# Patient Record
Sex: MALE | Race: WHITE | HISPANIC OR LATINO | Employment: OTHER | URBAN - METROPOLITAN AREA
[De-identification: names, ages, dates, MRNs, and addresses within clinical notes are randomized per-mention and may not be internally consistent; named-entity substitution may affect disease eponyms.]

---

## 2018-07-26 ENCOUNTER — HOSPITAL ENCOUNTER (EMERGENCY)
Facility: HOSPITAL | Age: 73
Discharge: LEFT BEFORE DISCHARGE | End: 2018-07-26
Attending: EMERGENCY MEDICINE | Admitting: EMERGENCY MEDICINE
Payer: MEDICARE

## 2018-07-26 ENCOUNTER — APPOINTMENT (EMERGENCY)
Dept: RADIOLOGY | Facility: HOSPITAL | Age: 73
End: 2018-07-26
Payer: MEDICARE

## 2018-07-26 VITALS
RESPIRATION RATE: 14 BRPM | OXYGEN SATURATION: 99 % | DIASTOLIC BLOOD PRESSURE: 96 MMHG | HEART RATE: 88 BPM | SYSTOLIC BLOOD PRESSURE: 157 MMHG | TEMPERATURE: 97 F | WEIGHT: 189.31 LBS

## 2018-07-26 DIAGNOSIS — R07.9 CHEST PAIN: Primary | ICD-10-CM

## 2018-07-26 LAB
ALBUMIN SERPL BCP-MCNC: 4.4 G/DL (ref 3–5.2)
ALP SERPL-CCNC: 74 U/L (ref 43–122)
ALT SERPL W P-5'-P-CCNC: 31 U/L (ref 9–52)
ANION GAP SERPL CALCULATED.3IONS-SCNC: 12 MMOL/L (ref 5–14)
APTT PPP: 29 SECONDS (ref 23–34)
AST SERPL W P-5'-P-CCNC: 24 U/L (ref 17–59)
BASOPHILS # BLD AUTO: 0.1 THOUSANDS/ΜL (ref 0–0.1)
BASOPHILS NFR BLD AUTO: 1 % (ref 0–1)
BILIRUB SERPL-MCNC: 0.6 MG/DL
BUN SERPL-MCNC: 17 MG/DL (ref 5–25)
CALCIUM SERPL-MCNC: 9.5 MG/DL (ref 8.4–10.2)
CHLORIDE SERPL-SCNC: 106 MMOL/L (ref 97–108)
CK SERPL-CCNC: 120 U/L (ref 55–170)
CO2 SERPL-SCNC: 23 MMOL/L (ref 22–30)
CREAT SERPL-MCNC: 1.19 MG/DL (ref 0.7–1.5)
EOSINOPHIL # BLD AUTO: 0.1 THOUSAND/ΜL (ref 0–0.4)
EOSINOPHIL NFR BLD AUTO: 1 % (ref 0–6)
ERYTHROCYTE [DISTWIDTH] IN BLOOD BY AUTOMATED COUNT: 13.9 %
GFR SERPL CREATININE-BSD FRML MDRD: 61 ML/MIN/1.73SQ M
GLUCOSE SERPL-MCNC: 140 MG/DL (ref 70–99)
HCT VFR BLD AUTO: 46.8 % (ref 41–53)
HGB BLD-MCNC: 15.7 G/DL (ref 13.5–17.5)
INR PPP: 1.02 (ref 0.89–1.1)
LYMPHOCYTES # BLD AUTO: 1.3 THOUSANDS/ΜL (ref 0.5–4)
LYMPHOCYTES NFR BLD AUTO: 14 % (ref 20–50)
MAGNESIUM SERPL-MCNC: 2.2 MG/DL (ref 1.6–2.3)
MCH RBC QN AUTO: 30.1 PG (ref 26–34)
MCHC RBC AUTO-ENTMCNC: 33.6 G/DL (ref 31–36)
MCV RBC AUTO: 90 FL (ref 80–100)
MONOCYTES # BLD AUTO: 0.5 THOUSAND/ΜL (ref 0.2–0.9)
MONOCYTES NFR BLD AUTO: 5 % (ref 1–10)
NEUTROPHILS # BLD AUTO: 7.3 THOUSANDS/ΜL (ref 1.8–7.8)
NEUTS SEG NFR BLD AUTO: 79 % (ref 45–65)
NT-PROBNP SERPL-MCNC: 459 PG/ML (ref 0–299)
PLATELET # BLD AUTO: 217 THOUSANDS/UL (ref 150–450)
PMV BLD AUTO: 8.7 FL (ref 8.9–12.7)
POTASSIUM SERPL-SCNC: 4.1 MMOL/L (ref 3.6–5)
PROT SERPL-MCNC: 8.2 G/DL (ref 5.9–8.4)
PROTHROMBIN TIME: 10.8 SECONDS (ref 9.5–11.6)
RBC # BLD AUTO: 5.22 MILLION/UL (ref 4.5–5.9)
SODIUM SERPL-SCNC: 141 MMOL/L (ref 137–147)
TROPONIN I SERPL-MCNC: <0.01 NG/ML (ref 0–0.03)
TSH SERPL DL<=0.05 MIU/L-ACNC: 1.26 UIU/ML (ref 0.47–4.68)
WBC # BLD AUTO: 9.2 THOUSAND/UL (ref 4.5–11)

## 2018-07-26 PROCEDURE — 83880 ASSAY OF NATRIURETIC PEPTIDE: CPT | Performed by: EMERGENCY MEDICINE

## 2018-07-26 PROCEDURE — 84484 ASSAY OF TROPONIN QUANT: CPT | Performed by: EMERGENCY MEDICINE

## 2018-07-26 PROCEDURE — 36415 COLL VENOUS BLD VENIPUNCTURE: CPT | Performed by: EMERGENCY MEDICINE

## 2018-07-26 PROCEDURE — 82550 ASSAY OF CK (CPK): CPT | Performed by: EMERGENCY MEDICINE

## 2018-07-26 PROCEDURE — 80053 COMPREHEN METABOLIC PANEL: CPT | Performed by: EMERGENCY MEDICINE

## 2018-07-26 PROCEDURE — 84443 ASSAY THYROID STIM HORMONE: CPT | Performed by: EMERGENCY MEDICINE

## 2018-07-26 PROCEDURE — 83735 ASSAY OF MAGNESIUM: CPT | Performed by: EMERGENCY MEDICINE

## 2018-07-26 PROCEDURE — 71045 X-RAY EXAM CHEST 1 VIEW: CPT

## 2018-07-26 PROCEDURE — 93005 ELECTROCARDIOGRAM TRACING: CPT

## 2018-07-26 PROCEDURE — 85610 PROTHROMBIN TIME: CPT | Performed by: EMERGENCY MEDICINE

## 2018-07-26 PROCEDURE — 99285 EMERGENCY DEPT VISIT HI MDM: CPT

## 2018-07-26 PROCEDURE — 85025 COMPLETE CBC W/AUTO DIFF WBC: CPT | Performed by: EMERGENCY MEDICINE

## 2018-07-26 PROCEDURE — 85730 THROMBOPLASTIN TIME PARTIAL: CPT | Performed by: EMERGENCY MEDICINE

## 2018-07-26 RX ORDER — ASPIRIN 81 MG/1
TABLET, CHEWABLE ORAL
Status: DISCONTINUED
Start: 2018-07-26 | End: 2018-07-27 | Stop reason: HOSPADM

## 2018-07-26 RX ORDER — ASPIRIN 81 MG/1
162 TABLET, CHEWABLE ORAL ONCE
Status: COMPLETED | OUTPATIENT
Start: 2018-07-26 | End: 2018-07-26

## 2018-07-26 RX ADMIN — ASPIRIN 81 MG 162 MG: 81 TABLET ORAL at 22:39

## 2018-07-27 LAB
ATRIAL RATE: 84 BPM
P AXIS: 37 DEGREES
PR INTERVAL: 152 MS
QRS AXIS: -4 DEGREES
QRSD INTERVAL: 80 MS
QT INTERVAL: 366 MS
QTC INTERVAL: 432 MS
T WAVE AXIS: -14 DEGREES
VENTRICULAR RATE: 84 BPM

## 2018-07-27 PROCEDURE — 93010 ELECTROCARDIOGRAM REPORT: CPT | Performed by: INTERNAL MEDICINE

## 2018-07-27 NOTE — ED NOTES
Patient came out of the room with all of his clothing and stating that he wants to leave  We made him aware that if he is leaving he is leaving against medical advice  Dr Luli Guthrie was in to see patient and talk to him making him aware that he should be staying, patient understood the same       Britney Esparza RN  07/26/18 9685

## 2018-07-27 NOTE — ED PROVIDER NOTES
History  Chief Complaint   Patient presents with    Chest Pain     Midsternal chest pain that started an hour ago describes as tightness  "My left arm is shaking"  Denies shortness of breath, fevers, nausea  Didn't take anything for the pain  Patient reported less than 1 hr prior to coming to ER while sitting watching TV developed mild chest pain described distal located to the anterior chest wall socially with left hand numbness  Onset gradual persisted which prompted the ER evaluation  Patient attempted no self management  None       History reviewed  No pertinent past medical history  Past Surgical History:   Procedure Laterality Date    HERNIA REPAIR         History reviewed  No pertinent family history  I have reviewed and agree with the history as documented  Social History   Substance Use Topics    Smoking status: Never Smoker    Smokeless tobacco: Never Used    Alcohol use No        Review of Systems   Constitutional: Negative for diaphoresis  Respiratory: Negative  Gastrointestinal: Negative for nausea  All other systems reviewed and are negative  Physical Exam  Physical Exam   Constitutional: He is oriented to person, place, and time  He appears well-developed and well-nourished  HENT:   Head: Normocephalic  Eyes: Pupils are equal, round, and reactive to light  Neck: Normal range of motion  Neck supple  Cardiovascular: Normal rate  Pulmonary/Chest: Effort normal and breath sounds normal    Abdominal: Soft  Bowel sounds are normal    Musculoskeletal: Normal range of motion  Neurological: He is alert and oriented to person, place, and time  Skin: Skin is warm  Psychiatric: He has a normal mood and affect  His behavior is normal    Nursing note and vitals reviewed        Vital Signs  ED Triage Vitals [07/26/18 2154]   Temperature Pulse Respirations Blood Pressure SpO2   (!) 97 °F (36 1 °C) 88 14 157/96 99 %      Temp Source Heart Rate Source Patient Position - Orthostatic VS BP Location FiO2 (%)   Temporal -- -- -- --      Pain Score       3           Vitals:    07/26/18 2154   BP: 157/96   Pulse: 88       Visual Acuity      ED Medications  Medications   aspirin chewable tablet 162 mg (162 mg Oral Given 7/26/18 2239)       Diagnostic Studies  Results Reviewed     Procedure Component Value Units Date/Time    NT-BNP PRO [[id number]]  (Abnormal) Collected:  07/26/18 2236    Lab Status:  Final result Specimen:  Blood from Arm, Left Updated:  07/26/18 2313     NT-proBNP 459 (H) pg/mL     Troponin I [[id number]]  (Normal) Collected:  07/26/18 2236    Lab Status:  Final result Specimen:  Blood from Arm, Left Updated:  07/26/18 2313     Troponin I <0 01 ng/mL     Magnesium [[id number]]  (Normal) Collected:  07/26/18 2236    Lab Status:  Final result Specimen:  Blood from Arm, Left Updated:  07/26/18 2303     Magnesium 2 2 mg/dL     CK [[id number]]  (Normal) Collected:  07/26/18 2236    Lab Status:  Final result Specimen:  Blood from Arm, Left Updated:  07/26/18 2303     Total  U/L     Comprehensive metabolic panel [[id number]]  (Abnormal) Collected:  07/26/18 2236    Lab Status:  Final result Specimen:  Blood from Arm, Left Updated:  07/26/18 2303     Sodium 141 mmol/L      Potassium 4 1 mmol/L      Chloride 106 mmol/L      CO2 23 mmol/L      Anion Gap 12 mmol/L      BUN 17 mg/dL      Creatinine 1 19 mg/dL      Glucose 140 (H) mg/dL      Calcium 9 5 mg/dL      AST 24 U/L      ALT 31 U/L      Alkaline Phosphatase 74 U/L      Total Protein 8 2 g/dL      Albumin 4 4 g/dL      Total Bilirubin 0 60 mg/dL      eGFR 61 ml/min/1 73sq m     Narrative:         National Kid[employer] Disease Education Program recommendations are as follows:  GFR calculation is accurate only with a steady state creatinine  Chronic Kidney disease less than 60 ml/min/1 73 sq  meters  Kidney failure less than 15 ml/min/1 73 sq  meters      Protime-INR [[id number]]  (Normal) Collected:  07/26/18 2236    Lab Status: Final result Specimen:  Blood from Arm, Left Updated:  07/26/18 2302     Protime 10 8 seconds      INR 1 02    Narrative:       INR:  ,PROTIME:      APTT [[id number]]  (Normal) Collected:  07/26/18 2236    Lab Status:  Final result Specimen:  Blood from Arm, Left Updated:  07/26/18 2302     PTT 29 seconds     Narrative:       PTT:      TSH, 3rd generation [[id number]] Collected:  07/26/18 2236    Lab Status: In process Specimen:  Blood from Arm, Left Updated:  07/26/18 2251    CBC and differential [[id number]]  (Abnormal) Collected:  07/26/18 2236    Lab Status:  Final result Specimen:  Blood from Arm, Left Updated:  07/26/18 2251     WBC 9 20 Thousand/uL      RBC 5 22 Million/uL      Hemoglobin 15 7 g/dL      Hematocrit 46 8 %      MCV 90 fL      MCH 30 1 pg      MCHC 33 6 g/dL      RDW 13 9 %      MPV 8 7 (L) fL      Platelets 870 Thousands/uL      Neutrophils Relative 79 (H) %      Lymphocytes Relative 14 (L) %      Monocytes Relative 5 %      Eosinophils Relative 1 %      Basophils Relative 1 %      Neutrophils Absolute 7 30 Thousands/µL      Lymphocytes Absolute 1 30 Thousands/µL      Monocytes Absolute 0 50 Thousand/µL      Eosinophils Absolute 0 10 Thousand/µL      Basophils Absolute 0 10 Thousands/µL     UA w Reflex to Microscopic w Reflex to Culture [[id number]]     Lab Status:  No result Specimen:  Urine                  XR chest portable    (Results Pending)              Procedures  Procedures       Phone Contacts  ED Phone Contact    ED Cours patient advised that he would like to leave against medical advice      patient advised that he is going to get some medical advice he would return if his symptoms worsen    At time of discharge patient was pain-free                          MDM    CritCare Time    Disposition  Final diagnoses:   Chest pain     Time reflects when diagnosis was documented in both MDM as applicable and the Disposition within this note     Time User Action Codes Description Comment 7/26/2018 11:16 PM Justo Waters Add [R07 9] Chest pain       ED Disposition     ED Disposition Condition Comment    AMA  Date: 7/26/2018  Patient: Pedro Moe  Admitted: 7/26/2018  9:56 PM  Attending Provider: Jovi Ochoa MD    Pedro Moe or his authorized caregiver has made the decision for the patient to leave the emergency department against the advice  He  or his authorized caregiver has been informed and understands the inherent risks, including death  He or his authorized caregiver has decided to accept the responsibility for this decision  Pedro Moe and all necessary parties have been advised gail t he may return for further evaluation or treatment  His condition at time of discharge was stable  Pedro Moe had current vital signs as follows:  /96   Pulse 88   Temp (!) 97 °F (36 1 °C) (Temporal)   Resp 14   Wt 85 9 kg (189 lb 5 oz)          Follow-up Information    None         Patient's Medications    No medications on file     No discharge procedures on file      ED Provider  Electronically Signed by           Jovi Ochoa MD  07/26/18 5566

## 2023-04-17 ENCOUNTER — APPOINTMENT (OUTPATIENT)
Dept: LAB | Facility: HOSPITAL | Age: 78
End: 2023-04-17

## 2023-04-17 DIAGNOSIS — Z76.89 ENCOUNTER TO ESTABLISH CARE WITH NEW DOCTOR: ICD-10-CM

## 2023-04-17 LAB
25(OH)D3 SERPL-MCNC: 26.6 NG/ML (ref 30–100)
ALBUMIN SERPL BCP-MCNC: 4.4 G/DL (ref 3.5–5)
ALP SERPL-CCNC: 52 U/L (ref 34–104)
ALT SERPL W P-5'-P-CCNC: 21 U/L (ref 7–52)
ANION GAP SERPL CALCULATED.3IONS-SCNC: 9 MMOL/L (ref 4–13)
AST SERPL W P-5'-P-CCNC: 21 U/L (ref 13–39)
BASOPHILS # BLD AUTO: 0.06 THOUSANDS/ΜL (ref 0–0.1)
BASOPHILS NFR BLD AUTO: 1 % (ref 0–1)
BILIRUB SERPL-MCNC: 0.44 MG/DL (ref 0.2–1)
BUN SERPL-MCNC: 27 MG/DL (ref 5–25)
CALCIUM SERPL-MCNC: 9.5 MG/DL (ref 8.4–10.2)
CHLORIDE SERPL-SCNC: 108 MMOL/L (ref 96–108)
CHOLEST SERPL-MCNC: 171 MG/DL
CO2 SERPL-SCNC: 24 MMOL/L (ref 21–32)
CREAT SERPL-MCNC: 1.31 MG/DL (ref 0.6–1.3)
EOSINOPHIL # BLD AUTO: 0.25 THOUSAND/ΜL (ref 0–0.61)
EOSINOPHIL NFR BLD AUTO: 4 % (ref 0–6)
ERYTHROCYTE [DISTWIDTH] IN BLOOD BY AUTOMATED COUNT: 12.6 % (ref 11.6–15.1)
GFR SERPL CREATININE-BSD FRML MDRD: 52 ML/MIN/1.73SQ M
GLUCOSE P FAST SERPL-MCNC: 97 MG/DL (ref 65–99)
HCT VFR BLD AUTO: 43.3 % (ref 36.5–49.3)
HDLC SERPL-MCNC: 48 MG/DL
HGB BLD-MCNC: 13.8 G/DL (ref 12–17)
IMM GRANULOCYTES # BLD AUTO: 0.03 THOUSAND/UL (ref 0–0.2)
IMM GRANULOCYTES NFR BLD AUTO: 0 % (ref 0–2)
LDLC SERPL CALC-MCNC: 82 MG/DL (ref 0–100)
LYMPHOCYTES # BLD AUTO: 2.02 THOUSANDS/ΜL (ref 0.6–4.47)
LYMPHOCYTES NFR BLD AUTO: 29 % (ref 14–44)
MCH RBC QN AUTO: 29.2 PG (ref 26.8–34.3)
MCHC RBC AUTO-ENTMCNC: 31.9 G/DL (ref 31.4–37.4)
MCV RBC AUTO: 92 FL (ref 82–98)
MONOCYTES # BLD AUTO: 0.51 THOUSAND/ΜL (ref 0.17–1.22)
MONOCYTES NFR BLD AUTO: 7 % (ref 4–12)
NEUTROPHILS # BLD AUTO: 4.13 THOUSANDS/ΜL (ref 1.85–7.62)
NEUTS SEG NFR BLD AUTO: 59 % (ref 43–75)
NONHDLC SERPL-MCNC: 123 MG/DL
NRBC BLD AUTO-RTO: 0 /100 WBCS
PLATELET # BLD AUTO: 171 THOUSANDS/UL (ref 149–390)
PMV BLD AUTO: 11 FL (ref 8.9–12.7)
POTASSIUM SERPL-SCNC: 4.2 MMOL/L (ref 3.5–5.3)
PROT SERPL-MCNC: 7.1 G/DL (ref 6.4–8.4)
RBC # BLD AUTO: 4.72 MILLION/UL (ref 3.88–5.62)
SODIUM SERPL-SCNC: 141 MMOL/L (ref 135–147)
TRIGL SERPL-MCNC: 206 MG/DL
TSH SERPL DL<=0.05 MIU/L-ACNC: 2.55 UIU/ML (ref 0.45–4.5)
URATE SERPL-MCNC: 6.2 MG/DL (ref 3.5–8.5)
VIT B12 SERPL-MCNC: 315 PG/ML (ref 100–900)
WBC # BLD AUTO: 7 THOUSAND/UL (ref 4.31–10.16)

## 2023-04-18 LAB
EST. AVERAGE GLUCOSE BLD GHB EST-MCNC: 114 MG/DL
HBA1C MFR BLD: 5.6 %
LPA SERPL-SCNC: 30.3 NMOL/L
PSA FREE MFR SERPL: 15 %
PSA FREE SERPL-MCNC: 0.12 NG/ML
PSA SERPL-MCNC: 0.8 NG/ML (ref 0–4)

## 2023-04-19 LAB — BACTERIA UR CULT: NORMAL

## 2023-06-07 ENCOUNTER — APPOINTMENT (OUTPATIENT)
Dept: LAB | Facility: HOSPITAL | Age: 78
End: 2023-06-07
Payer: COMMERCIAL

## 2023-06-07 DIAGNOSIS — E78.5 DYSLIPIDEMIA: ICD-10-CM

## 2023-06-07 DIAGNOSIS — I10 HYPERTENSION, UNSPECIFIED TYPE: ICD-10-CM

## 2023-06-07 DIAGNOSIS — N18.31 CHRONIC KIDNEY DISEASE (CKD) STAGE G3A/A1, MODERATELY DECREASED GLOMERULAR FILTRATION RATE (GFR) BETWEEN 45-59 ML/MIN/1.73 SQUARE METER AND ALBUMINURIA CREATININE RATIO LESS THAN 30 MG/G (HCC): ICD-10-CM

## 2023-06-07 LAB
ALBUMIN SERPL BCP-MCNC: 4.2 G/DL (ref 3.5–5)
ALP SERPL-CCNC: 48 U/L (ref 34–104)
ALT SERPL W P-5'-P-CCNC: 14 U/L (ref 7–52)
ANION GAP SERPL CALCULATED.3IONS-SCNC: 7 MMOL/L (ref 4–13)
AST SERPL W P-5'-P-CCNC: 17 U/L (ref 13–39)
BASOPHILS # BLD AUTO: 0.07 THOUSANDS/ÂΜL (ref 0–0.1)
BASOPHILS NFR BLD AUTO: 1 % (ref 0–1)
BILIRUB SERPL-MCNC: 0.44 MG/DL (ref 0.2–1)
BUN SERPL-MCNC: 19 MG/DL (ref 5–25)
CALCIUM SERPL-MCNC: 9.1 MG/DL (ref 8.4–10.2)
CHLORIDE SERPL-SCNC: 110 MMOL/L (ref 96–108)
CO2 SERPL-SCNC: 25 MMOL/L (ref 21–32)
CREAT SERPL-MCNC: 1.39 MG/DL (ref 0.6–1.3)
EOSINOPHIL # BLD AUTO: 0.22 THOUSAND/ÂΜL (ref 0–0.61)
EOSINOPHIL NFR BLD AUTO: 3 % (ref 0–6)
ERYTHROCYTE [DISTWIDTH] IN BLOOD BY AUTOMATED COUNT: 12.8 % (ref 11.6–15.1)
GFR SERPL CREATININE-BSD FRML MDRD: 48 ML/MIN/1.73SQ M
GLUCOSE P FAST SERPL-MCNC: 97 MG/DL (ref 65–99)
HCT VFR BLD AUTO: 45.7 % (ref 36.5–49.3)
HGB BLD-MCNC: 14.3 G/DL (ref 12–17)
IMM GRANULOCYTES # BLD AUTO: 0.01 THOUSAND/UL (ref 0–0.2)
IMM GRANULOCYTES NFR BLD AUTO: 0 % (ref 0–2)
LYMPHOCYTES # BLD AUTO: 1.71 THOUSANDS/ÂΜL (ref 0.6–4.47)
LYMPHOCYTES NFR BLD AUTO: 25 % (ref 14–44)
MCH RBC QN AUTO: 28.9 PG (ref 26.8–34.3)
MCHC RBC AUTO-ENTMCNC: 31.3 G/DL (ref 31.4–37.4)
MCV RBC AUTO: 93 FL (ref 82–98)
MONOCYTES # BLD AUTO: 0.41 THOUSAND/ÂΜL (ref 0.17–1.22)
MONOCYTES NFR BLD AUTO: 6 % (ref 4–12)
NEUTROPHILS # BLD AUTO: 4.55 THOUSANDS/ÂΜL (ref 1.85–7.62)
NEUTS SEG NFR BLD AUTO: 65 % (ref 43–75)
NRBC BLD AUTO-RTO: 0 /100 WBCS
PLATELET # BLD AUTO: 175 THOUSANDS/UL (ref 149–390)
PMV BLD AUTO: 10.8 FL (ref 8.9–12.7)
POTASSIUM SERPL-SCNC: 4.3 MMOL/L (ref 3.5–5.3)
PROT SERPL-MCNC: 7.2 G/DL (ref 6.4–8.4)
RBC # BLD AUTO: 4.94 MILLION/UL (ref 3.88–5.62)
SODIUM SERPL-SCNC: 142 MMOL/L (ref 135–147)
URATE SERPL-MCNC: 4.4 MG/DL (ref 3.5–8.5)
WBC # BLD AUTO: 6.97 THOUSAND/UL (ref 4.31–10.16)

## 2023-06-07 PROCEDURE — 84550 ASSAY OF BLOOD/URIC ACID: CPT

## 2023-06-07 PROCEDURE — 85025 COMPLETE CBC W/AUTO DIFF WBC: CPT

## 2023-06-07 PROCEDURE — 80053 COMPREHEN METABOLIC PANEL: CPT

## 2023-06-07 PROCEDURE — 36415 COLL VENOUS BLD VENIPUNCTURE: CPT

## 2023-06-16 ENCOUNTER — APPOINTMENT (OUTPATIENT)
Dept: CT IMAGING | Facility: HOSPITAL | Age: 78
End: 2023-06-16
Attending: INTERNAL MEDICINE
Payer: COMMERCIAL

## 2023-06-16 ENCOUNTER — HOSPITAL ENCOUNTER (OUTPATIENT)
Dept: MRI IMAGING | Facility: HOSPITAL | Age: 78
End: 2023-06-16
Attending: INTERNAL MEDICINE
Payer: COMMERCIAL

## 2023-06-16 ENCOUNTER — HOSPITAL ENCOUNTER (OUTPATIENT)
Dept: ULTRASOUND IMAGING | Facility: HOSPITAL | Age: 78
End: 2023-06-16
Attending: INTERNAL MEDICINE
Payer: COMMERCIAL

## 2023-06-16 DIAGNOSIS — R41.3 MEMORY LOSS: ICD-10-CM

## 2023-06-16 DIAGNOSIS — N18.31 CHRONIC KIDNEY DISEASE (CKD) STAGE G3A/A1, MODERATELY DECREASED GLOMERULAR FILTRATION RATE (GFR) BETWEEN 45-59 ML/MIN/1.73 SQUARE METER AND ALBUMINURIA CREATININE RATIO LESS THAN 30 MG/G (HCC): ICD-10-CM

## 2023-06-16 PROCEDURE — G1004 CDSM NDSC: HCPCS

## 2023-06-16 PROCEDURE — 76775 US EXAM ABDO BACK WALL LIM: CPT

## 2023-06-16 PROCEDURE — 70551 MRI BRAIN STEM W/O DYE: CPT

## 2023-06-16 PROCEDURE — 70544 MR ANGIOGRAPHY HEAD W/O DYE: CPT

## 2023-06-30 ENCOUNTER — HOSPITAL ENCOUNTER (OUTPATIENT)
Dept: NON INVASIVE DIAGNOSTICS | Facility: HOSPITAL | Age: 78
Discharge: HOME | End: 2023-06-30
Attending: INTERNAL MEDICINE
Payer: COMMERCIAL

## 2023-06-30 VITALS
WEIGHT: 189 LBS | SYSTOLIC BLOOD PRESSURE: 157 MMHG | HEART RATE: 88 BPM | HEIGHT: 67 IN | BODY MASS INDEX: 29.66 KG/M2 | DIASTOLIC BLOOD PRESSURE: 96 MMHG

## 2023-06-30 DIAGNOSIS — R09.89 BRUIT: ICD-10-CM

## 2023-06-30 DIAGNOSIS — I10 HYPERTENSION, UNSPECIFIED TYPE: ICD-10-CM

## 2023-06-30 LAB
AORTIC ROOT: 4.3 CM
AORTIC VALVE MEAN VELOCITY: 6.8 M/S
APICAL FOUR CHAMBER EJECTION FRACTION: 47 %
ASCENDING AORTA: 4 CM
AV LVOT MEAN GRADIENT: 2 MMHG
AV LVOT PEAK GRADIENT: 3 MMHG
AV MEAN GRADIENT: 2 MMHG
AV PEAK GRADIENT: 5 MMHG
AV VELOCITY RATIO: 0.8
DOP CALC AO PEAK VEL: 1.07 M/S
DOP CALC AO VTI: 28.75 CM
DOP CALC LVOT PEAK VEL VTI: 22.62 CM
DOP CALC LVOT PEAK VEL: 0.86 M/S
FRACTIONAL SHORTENING: 29 % (ref 28–44)
INTERVENTRICULAR SEPTUM IN DIASTOLE (PARASTERNAL SHORT AXIS VIEW): 0.8 CM
INTERVENTRICULAR SEPTUM: 0.8 CM (ref 0.6–1.1)
LAAS-AP2: 18.9 CM2
LAAS-AP4: 14.5 CM2
LEFT ATRIUM SIZE: 4.3 CM
LEFT ATRIUM VOLUME (MOD BIPLANE): 48 ML
LEFT INTERNAL DIMENSION IN SYSTOLE: 4 CM (ref 2.1–4)
LEFT VENTRICULAR INTERNAL DIMENSION IN DIASTOLE: 5.6 CM (ref 3.5–6)
LEFT VENTRICULAR POSTERIOR WALL IN END DIASTOLE: 0.8 CM
LEFT VENTRICULAR STROKE VOLUME: 83 ML
LVSV (TEICH): 83 ML
MV E'TISSUE VEL-SEP: 5 CM/S
RIGHT VENTRICLE ID DIMENSION: 3.2 CM
SINOTUBULAR JUNCTION: 3.2 CM
SL CV LEFT ATRIUM LENGTH A2C: 4.6 CM
SL CV LV EF: 40
SL CV PED ECHO LEFT VENTRICLE DIASTOLIC VOLUME (MOD BIPLANE) 2D: 152 ML
SL CV PED ECHO LEFT VENTRICLE SYSTOLIC VOLUME (MOD BIPLANE) 2D: 69 ML
SL CV SINUS OF VALSALVA 2D: 4.3 CM
STJ: 3.2 CM
TR MAX PG: 25 MMHG
TR PEAK VELOCITY: 2.5 M/S
TRICUSPID ANNULAR PLANE SYSTOLIC EXCURSION: 2.1 CM
TRICUSPID VALVE PEAK REGURGITATION VELOCITY: 2.5 M/S

## 2023-06-30 PROCEDURE — 93306 TTE W/DOPPLER COMPLETE: CPT

## 2023-06-30 PROCEDURE — 93880 EXTRACRANIAL BILAT STUDY: CPT

## 2023-07-06 ENCOUNTER — TELEPHONE (OUTPATIENT)
Dept: CARDIOLOGY CLINIC | Facility: CLINIC | Age: 78
End: 2023-07-06

## 2023-07-06 NOTE — TELEPHONE ENCOUNTER
Called patient 3x first time got voicemail with mailbox full tried two other times rings busy like the phone Is not in service  needs his apt moved up to 12PM on 7-14 sent a letter

## 2023-07-14 ENCOUNTER — OFFICE VISIT (OUTPATIENT)
Dept: CARDIOLOGY CLINIC | Facility: CLINIC | Age: 78
End: 2023-07-14
Payer: COMMERCIAL

## 2023-07-14 VITALS
SYSTOLIC BLOOD PRESSURE: 108 MMHG | HEIGHT: 67 IN | BODY MASS INDEX: 27.31 KG/M2 | HEART RATE: 73 BPM | DIASTOLIC BLOOD PRESSURE: 58 MMHG | WEIGHT: 174 LBS

## 2023-07-14 DIAGNOSIS — I25.5 ISCHEMIC CARDIOMYOPATHY: ICD-10-CM

## 2023-07-14 DIAGNOSIS — Z86.73 HISTORY OF STROKE: ICD-10-CM

## 2023-07-14 DIAGNOSIS — I48.0 PAROXYSMAL ATRIAL FIBRILLATION (HCC): ICD-10-CM

## 2023-07-14 DIAGNOSIS — I25.2 HISTORY OF MI (MYOCARDIAL INFARCTION): ICD-10-CM

## 2023-07-14 DIAGNOSIS — I25.10 ATHEROSCLEROSIS OF NATIVE CORONARY ARTERY OF NATIVE HEART WITHOUT ANGINA PECTORIS: ICD-10-CM

## 2023-07-14 DIAGNOSIS — I49.9 CARDIAC ARRHYTHMIA, UNSPECIFIED CARDIAC ARRHYTHMIA TYPE: Primary | ICD-10-CM

## 2023-07-14 PROCEDURE — 93000 ELECTROCARDIOGRAM COMPLETE: CPT | Performed by: INTERNAL MEDICINE

## 2023-07-14 PROCEDURE — 99204 OFFICE O/P NEW MOD 45 MIN: CPT | Performed by: INTERNAL MEDICINE

## 2023-07-14 RX ORDER — SIMVASTATIN 40 MG
TABLET ORAL EVERY 24 HOURS
COMMUNITY

## 2023-07-14 RX ORDER — BROMOCRIPTINE MESYLATE 2.5 MG/1
TABLET ORAL EVERY 24 HOURS
COMMUNITY
Start: 2023-04-28

## 2023-07-14 RX ORDER — ICOSAPENT ETHYL 1000 MG/1
CAPSULE ORAL EVERY 12 HOURS
COMMUNITY
Start: 2023-04-28 | End: 2023-08-14

## 2023-07-14 RX ORDER — LISINOPRIL 20 MG/1
TABLET ORAL EVERY 24 HOURS
COMMUNITY

## 2023-07-14 RX ORDER — MEMANTINE HYDROCHLORIDE 10 MG/1
TABLET ORAL
COMMUNITY
Start: 2023-04-28

## 2023-07-14 RX ORDER — ALLOPURINOL 100 MG/1
TABLET ORAL
COMMUNITY
Start: 2023-05-03

## 2023-07-14 RX ORDER — ASPIRIN 81 MG/1
TABLET ORAL EVERY 24 HOURS
COMMUNITY
Start: 2023-04-28 | End: 2023-08-14

## 2023-07-14 RX ORDER — CLOPIDOGREL BISULFATE 75 MG/1
TABLET ORAL EVERY 24 HOURS
COMMUNITY

## 2023-07-14 NOTE — ASSESSMENT & PLAN NOTE
Mr. José Malhotra is a 61-year-old gentleman with known history of coronary artery disease with remote history of MI who was previously experiencing presyncopal events. Today talking to him he denies any specific symptoms. He did have a 24-hour Holter monitor that was concerning for paroxysmal atrial fibrillation. Review of the rhythm strip suggest possible sinus rhythm with premature atrial contractions although A-fib cannot be completely excluded. His recent echocardiogram has identified cardiomyopathy. Patient is unable to recall if he had this diagnosis in the past.  Unfortunately I do not have any of his prior cardiac work-up results. He is on reasonable medical regimen. He is currently on a single antiplatelet agent. -- At this time I am advising him to continue current medications. -- I will request an extended Holter monitor to screen for atrial fibrillation or other arrhythmias. -- As he is not having any significant symptoms recently I am withholding from pursuing ischemic work-up at this time but this can be considered in the future based on symptoms. -- I will plan to see him back in 2 months time to follow-up with the results and assess need for further work-up. -- I am advising him to stay active and to keep a note of symptoms. He is advised to reach out to us if he develops any dizziness lightheadedness palpitations or passing out or near passing out passing out episodes. Episodes or any other concerning symptoms.

## 2023-07-14 NOTE — PATIENT INSTRUCTIONS
CARDIOLOGY ASSESSMENT & PLAN:   Diagnosis ICD-10-CM Associated Orders   1. Cardiac arrhythmia, unspecified cardiac arrhythmia type  I49.9 POCT ECG      2. Paroxysmal atrial fibrillation (HCC)  I48.0 AMB extended holter monitor      3. Atherosclerosis of native coronary artery of native heart without angina pectoris  I25.10       4. History of MI (myocardial infarction)  I25.2       5. Ischemic cardiomyopathy  I25.5       6. History of stroke  Z86.73         Atherosclerosis of native coronary artery of native heart without angina pectoris  Mr. Angelica Ma is a 65-year-old gentleman with known history of coronary artery disease with remote history of MI who was previously experiencing presyncopal events. Today talking to him he denies any specific symptoms. He did have a 24-hour Holter monitor that was concerning for paroxysmal atrial fibrillation. Review of the rhythm strip suggest possible sinus rhythm with premature atrial contractions although A-fib cannot be completely excluded. His recent echocardiogram has identified cardiomyopathy. Patient is unable to recall if he had this diagnosis in the past.  Unfortunately I do not have any of his prior cardiac work-up results. He is on reasonable medical regimen. He is currently on a single antiplatelet agent. -- At this time I am advising him to continue current medications. -- I will request an extended Holter monitor to screen for atrial fibrillation or other arrhythmias. -- As he is not having any significant symptoms recently I am withholding from pursuing ischemic work-up at this time but this can be considered in the future based on symptoms. -- I will plan to see him back in 2 months time to follow-up with the results and assess need for further work-up. -- I am advising him to stay active and to keep a note of symptoms.   He is advised to reach out to us if he develops any dizziness lightheadedness palpitations or passing out or near passing out passing out episodes. Episodes or any other concerning symptoms.

## 2023-07-14 NOTE — PROGRESS NOTES
[facility]  [address] [facility] [address], [facility] [place] [address]  Phone#  [phone]. Fax#  [phone]  *-*-*-*-*-*-*-*-*-*-*-*-*-*-*-*-*-*-*-*-*-*-*-*-*-*-*-*-*-*-*-*-*-*-*-*-*-*-*-*-*-*-*-*-*-*-*-*-*-*-*-*-*-*  Amy Beal DATE: 07/14/23 4:22 PM  PATIENT NAME: Robinson Marcus   1945    [id number]  Age: 70 y.o. Sex: male  AUTHOR: Annmarie Canas MD  PRIMARYCARE PHYSICIAN: Danilo Morrell MD  REFERRING PHYSICIAN: Nirmal Cleaning MD  [address]  [address] [facility],  [address] [place] [address] Nirmal Cleaning MD   *-*-*-*-*-*-*-*-*-*-*-*-*-*-*-*-*-*-*-*-*-*-*-*-*-*-*-*-*-*-*-*-*-*-*-*-*-*-*-*-*-*-*-*-*-*-*-*-*-*-*-*-*-*-  REASON FOR REFERRAL: Abnormal cardiac rhythm and chest tightness    *-*-*-*-*-*-*-*-*-*-*-*-*-*-*-*-*-*-*-*-*-*-*-*-*-*-*-*-*-*-*-*-*-*-*-*-*-*-*-*-*-*-*-*-*-*-*-*-*-*-*-*-*-*-  CARDIOLOGY ASSESSMENT & PLAN:   Diagnosis ICD-10-CM Associated Orders   1. Cardiac arrhythmia, unspecified cardiac arrhythmia type  I49.9 POCT ECG      2. Paroxysmal atrial fibrillation (HCC)  I48.0 AMB extended holter monitor      3. Atherosclerosis of native coronary artery of native heart without angina pectoris  I25.10       4. History of MI (myocardial infarction)  I25.2       5. Ischemic cardiomyopathy  I25.5       6. History of stroke  Z86.73         Atherosclerosis of native coronary artery of native heart without angina pectoris  Mr. Robinson Marcus is a 70-year-old gentleman with known history of coronary artery disease with remote history of MI who was previously experiencing presyncopal events. Today talking to him he denies any specific symptoms. He did have a 24-hour Holter monitor that was concerning for paroxysmal atrial fibrillation. Review of the rhythm strip suggest possible sinus rhythm with premature atrial contractions although A-fib cannot be completely excluded. His recent echocardiogram has identified cardiomyopathy.   Patient is unable to recall if he had this diagnosis in the past.  Unfortunately I do not have any of his prior cardiac work-up results. He is on reasonable medical regimen. He is currently on a single antiplatelet agent. -- At this time I am advising him to continue current medications. -- I will request an extended Holter monitor to screen for atrial fibrillation or other arrhythmias. -- As he is not having any significant symptoms recently I am withholding from pursuing ischemic work-up at this time but this can be considered in the future based on symptoms. -- I will plan to see him back in 2 months time to follow-up with the results and assess need for further work-up. -- I am advising him to stay active and to keep a note of symptoms. He is advised to reach out to us if he develops any dizziness lightheadedness palpitations or passing out or near passing out passing out episodes. Episodes or any other concerning symptoms. *-*-*-*-*-*-*-*-*-*-*-*-*-*-*-*-*-*-*-*-*-*-*-*-*-*-*-*-*-*-*-*-*-*-*-*-*-*-*-*-*-*-*-*-*-*-*-*-*-*-*-*-*-*-  CURRENT ECG:  Results for orders placed or performed in visit on 07/14/23   POCT ECG    Narrative    Sinus rhythm with sinus arrhythmia axis, possible prior anterolateral T wave inversion in anterior and lateral leads, possible repolarization abnormality versus post MI sequelae. HR 73 bpm.  No significant chamber hypertrophy or enlargement. *-*-*-*-*-*-*-*-*-*-*-*-*-*-*-*-*-*-*-*-*-*-*-*-*-*-*-*-*-*-*-*-*-*-*-*-*-*-*-*-*-*-*-*-*-*-*-*-*-*-*-*-*-*-  HISTORY OF PRESENT ILLNESS:  Patient is a pleasant 22-year-old gentleman who is establishing care with us for his cardiac comorbidities and some recent concerns of arrhythmia noted at his primary physician's office. He is from [place] and he is accompanied with his wife who is from [facility]. As this primarily speaks [place] encounter is performed with help of [employer] translation service ( [id number]).     1. Coronary artery disease, history of unspecified heart surgery in 2007 (as per documentation from [facility] patient had anterior infarction). Patient is unable to recall this. 2.  History of stroke in 2019  3. Dyslipidemia  4. Pulm hypertension  5. History of tibial fracture status post left tibia surgery in 2021  6. Primary hypertension    Patient had been living in [place] in [facility]. In 2019 he had a stroke while living into medical public. He has not seen cardiologist for last several years and there is no record available for review regarding prior findings and interventions. Recently he presented to his primary physician in May 2023 to establish care. He was reporting some nonspecific chest pain and palpitations and presyncope. An extended Holter monitor was performed. Patient was monitored for 1 day between May 30 Ather MD Missael 4/20/2023. His average heart rate during the recording was 66 bpm with minimum heart rate 47 and maximum heart rate 113 bpm.  The documentation from the machine developed reports suggest occurrence of atrial fibrillation. He is now referred  for further evaluation. From a symptom perspective he reports that he has been feeling well. He denies any recent chest pain or shortness of breath or dizziness or palpitations. He has had no presyncope/syncope. He reports previously reported symptom of presyncope has not reoccurred. He reports being active and exercising regularly. He denies recent decline in exercise tolerance. Functional capacity status: Good   (Excellent- >10 METs; Good: (7-10 METs); Moderate (4-7 METs); Poor (<= 4 METs)    Any chronic stressors: None   (feeling of poor health, financial problems, and social isolation etc). Tobacco or alcohol dependence: He denies being a smoker ever. He denies any alcohol use. He has about 1 cup of coffee a day. Family history significant for history of breast cancer and lung cancer.   There is no family history of premature coronary artery disease or sudden cardiac death. Current medications include aspirin 81 mg daily, lisinopril 20 mg daily Plavix 75 mg daily simvastatin 75 mg daily Vascepa  2 g twice daily    Echocardiogram 6/30/2023:  Normal left ventricle size, moderately reduced left ventricular systolic function with regional wall motion abnormalities including apical and apical septal akinesis, hypokinesis of anterior apical apical inferior and apical lateral segments and suboptimal visualization of other segments. Normal right ventricle size and systolic function. Normal left and right atrial size, mildly thickened aortic valve. Calcification, mild aortic valve regurgitation. Mild mitral valve thickening, mitral annular calcification, no mitral valve stenosis or regurgitation. No tricuspid valve stenosis or regurgitation. Dilated aortic root and ectasia of ascending aorta. Aortic Marked root measures 4.3 cm ascending aorta 4.0 cm. Blood work from 6/9/2023 significant for sodium 142 potassium 4.3 chloride 110 bicarb 27 BUN 19 creatinine 1.39  Normal liver function test  Lipid profile in April shows total cholesterol 171 triglyceride 206 HDL 48 calculated LDL 82 lipoprotein a level 30.3  Vitamin D level 26.6  Normal hemoglobin and hematocrit  Hemoglobin A1c 5.6, last TSH 2.551    *-*-*-*-*-*-*-*-*-*-*-*-*-*-*-*-*-*-*-*-*-*-*-*-*-*-*-*-*-*-*-*-*-*-*-*-*-*-*-*-*-*-*-*-*-*-*-*-*-*-*-*-*-*  PAST MEDICAL HISTORY:  No past medical history on file. PAST SURGICAL HISTORY:   Past Surgical History:   Procedure Laterality Date   • HERNIA REPAIR           FAMILY HISTORY:  No family history on file.  SOCIAL HISTORY:  Social History     Tobacco Use   Smoking Status Never   Smokeless Tobacco Never      Social History     Substance and Sexual Activity   Alcohol Use No     Social History     Substance and Sexual Activity   Drug Use No    [id number] *-*-*-*-*-*-*-*-*-*-*-*-*-*-*-*-*-*-*-*-*-*-*-*-*-*-*-*-*-*-*-*-*-*-*-*-*-*-*-*-*-*-*-*-*-*-*-*-*-*-*-*-*-*  ALLERGIES:  No Known Allergies CURRENT SCHEDULED MEDICATIONS:    Current Outpatient Medications:   •  allopurinol (ZYLOPRIM) 100 mg tablet, HALF TABLET ORALLY ONCE A DAY 60 DAYS, Disp: , Rfl:   •  aspirin (ECOTRIN LOW STRENGTH) 81 mg EC tablet, every 24 hours, Disp: , Rfl:   •  bromocriptine (PARLODEL) 2.5 mg tablet, every 24 hours, Disp: , Rfl:   •  clopidogrel (Plavix) 75 mg tablet, every 24 hours, Disp: , Rfl:   •  dapagliflozin (Farxiga) 10 MG tablet, every 24 hours, Disp: , Rfl:   •  Icosapent Ethyl (Vascepa) 1 g CAPS, Every 12 hours, Disp: , Rfl:   •  lisinopril (ZESTRIL) 20 mg tablet, every 24 hours, Disp: , Rfl:   •  memantine (Namenda) 10 mg tablet, 1 tablet Orally nightly for 60 days, Disp: , Rfl:   •  simvastatin (ZOCOR) 40 mg tablet, every 24 hours, Disp: , Rfl:      *-*-*-*-*-*-*-*-*-*-*-*-*-*-*-*-*-*-*-*-*-*-*-*-*-*-*-*-*-*-*-*-*-*-*-*-*-*-*-*-*-*-*-*-*-*-*-*-*-*-*-*-*-*  REVIEW OF SYMPTOMS:    Positive for: As noted above in HPI  Negative for: All remaining as reviewed below and in HPI.  SYSTEM SYMPTOMS REVIEWED:  General--weight change, fever, night sweats  Respiratoryl-- Wheezing, shortness of breath, cough, URI symptoms, sputum, blood  Cardiovascular--chest pain, syncope, dyspnea on exertion, edema, decline in exercise tolerance, claudication   Gastrointestinal--persistent vomiting, diarrhea, abdominal distention, blood in stool   Muscular or skeletal--joint pain or swelling   Neurologic--headaches, syncope, abnormal movement  Hematologic--history of easy bruising and bleeding   Endocrine--thyroid enlargement, heat or cold intolerance, polyuria   Psychiatric--anxiety, depression      *-*-*-*-*-*-*-*-*-*-*-*-*-*-*-*-*-*-*-*-*-*-*-*-*-*-*-*-*-*-*-*-*-*-*-*-*-*-*-*-*-*-*-*-*-*-*-*-*-*-*-*-*-*  CURRENT OUTPATIENT MEDICATIONS:     Current Outpatient Medications:   •  allopurinol (ZYLOPRIM) 100 mg tablet, HALF TABLET ORALLY ONCE A DAY 60 DAYS, Disp: , Rfl:   •  aspirin (ECOTRIN LOW STRENGTH) 81 mg EC tablet, every 24 hours, Disp: , Rfl:   •  bromocriptine (PARLODEL) 2.5 mg tablet, every 24 hours, Disp: , Rfl:   •  clopidogrel (Plavix) 75 mg tablet, every 24 hours, Disp: , Rfl:   •  dapagliflozin (Farxiga) 10 MG tablet, every 24 hours, Disp: , Rfl:   •  Icosapent Ethyl (Vascepa) 1 g CAPS, Every 12 hours, Disp: , Rfl:   •  lisinopril (ZESTRIL) 20 mg tablet, every 24 hours, Disp: , Rfl:   •  memantine (Namenda) 10 mg tablet, 1 tablet Orally nightly for 60 days, Disp: , Rfl:   •  simvastatin (ZOCOR) 40 mg tablet, every 24 hours, Disp: , Rfl:     *-*-*-*-*-*-*-*-*-*-*-*-*-*-*-*-*-*-*-*-*-*-*-*-*-*-*-*-*-*-*-*-*-*-*-*-*-*-*-*-*-*-*-*-*-*-*-*-*-*-*-*-*-*  VITAL SIGNS:  Vitals:    07/14/23 1529   BP: 108/58   BP Location: Left arm   Patient Position: Sitting   Cuff Size: Adult   Pulse: 73   Weight: 78.9 kg (174 lb)   Height: 5' 7" (1.702 m)       BMI: Body mass index is 27.25 kg/m². WEIGHTS:   Wt Readings from Last 25 Encounters:   07/14/23 78.9 kg (174 lb)   06/30/23 85.7 kg (189 lb)   07/26/18 85.9 kg (189 lb 5 oz)        *-*-*-*-*-*-*-*-*-*-*-*-*-*-*-*-*-*-*-*-*-*-*-*-*-*-*-*-*-*-*-*-*-*-*-*-*-*-*-*-*-*-*-*-*-*-*-*-*-*-*-*-*-*-  PHYSICAL EXAM:  General Appearance:    Alert, cooperative, no distress, appears stated age   Head, Eyes, ENT:    No obvious abnormality, moist mucous mebranes. Neck:   Supple, no carotid bruit or JVD   Back:     Symmetric, no curvature. Lungs:     Respirations unlabored. Clear to auscultation bilaterally,    Chest wall:    No tenderness or deformity   Heart:    Regular rate and rhythm, S1 and S2 normal, no murmur, rub  or gallop. Abdomen:     Soft, non-tender, No obvious masses, or organomegaly   Extremities:   Extremities warm, no cyanosis or edema    Skin:   No venostatic changes in lower extremities. Normal skin color, texture, and turgor.  No rashes or lesions *-*-*-*-*-*-*-*-*-*-*-*-*-*-*-*-*-*-*-*-*-*-*-*-*-*-*-*-*-*-*-*-*-*-*-*-*-*-*-*-*-*-*-*-*-*-*-*-*-*-*-*-*-*-  LABORATORY DATA: I have personally reviewed the available laboratory data.         Potassium   Date Value Ref Range Status   06/07/2023 4.3 3.5 - 5.3 mmol/L Final   04/17/2023 4.2 3.5 - 5.3 mmol/L Final   07/26/2018 4.1 3.6 - 5.0 mmol/L Final   06/19/2018 3.4 (L) 3.6 - 5.0 MEQ/L Final     Chloride   Date Value Ref Range Status   06/07/2023 110 (H) 96 - 108 mmol/L Final   04/17/2023 108 96 - 108 mmol/L Final   07/26/2018 106 97 - 108 mmol/L Final   06/19/2018 106 97 - 108 MEQ/L Final     CO2   Date Value Ref Range Status   06/07/2023 25 21 - 32 mmol/L Final   04/17/2023 24 21 - 32 mmol/L Final   07/26/2018 23 22 - 30 mmol/L Final   06/19/2018 29 22 - 30 MMOL/L Final     Anion Gap   Date Value Ref Range Status   06/19/2018 10 5 - 14 MMOL/L Final     BUN   Date Value Ref Range Status   06/07/2023 19 5 - 25 mg/dL Final   04/17/2023 27 (H) 5 - 25 mg/dL Final   07/26/2018 17 5 - 25 mg/dL Final   06/19/2018 20 5 - 25 MG/DL Final     Creatinine   Date Value Ref Range Status   06/07/2023 1.39 (H) 0.60 - 1.30 mg/dL Final     Comment:     Standardized to IDMS reference method   04/17/2023 1.31 (H) 0.60 - 1.30 mg/dL Final     Comment:     Standardized to IDMS reference method   07/26/2018 1.19 0.70 - 1.50 mg/dL Final     Comment:     Standardized to IDMS reference method     eGFR   Date Value Ref Range Status   06/07/2023 48 ml/min/1.73sq m Final   04/17/2023 52 ml/min/1.73sq m Final   07/26/2018 61 >60 ml/min/1.73sq m Final     Glucose   Date Value Ref Range Status   06/19/2018 125 (H) 70 - 99 MG/DL Final     Calcium   Date Value Ref Range Status   06/07/2023 9.1 8.4 - 10.2 mg/dL Final   04/17/2023 9.5 8.4 - 10.2 mg/dL Final   07/26/2018 9.5 8.4 - 10.2 mg/dL Final   06/19/2018 9.0 8.4 - 10.2 MG/DL Final     AST   Date Value Ref Range Status   06/07/2023 17 13 - 39 U/L Final   04/17/2023 21 13 - 39 U/L Final 07/26/2018 24 17 - 59 U/L Final     Comment:       Specimen collection should occur prior to Sulfasalazine administration due to the potential for falsely depressed results. 06/19/2018 25 17 - 59 U/L Final     ALT   Date Value Ref Range Status   06/07/2023 14 7 - 52 U/L Final     Comment:     Specimen collection should occur prior to Sulfasalazine administration due to the potential for falsely depressed results. 04/17/2023 21 7 - 52 U/L Final     Comment:     Specimen collection should occur prior to Sulfasalazine administration due to the potential for falsely depressed results. 07/26/2018 31 9 - 52 U/L Final     Comment:       Specimen collection should occur prior to Sulfasalazine administration due to the potential for falsely depressed results.     06/19/2018 32 9 - 52 U/L Final     Alkaline Phosphatase   Date Value Ref Range Status   06/07/2023 48 34 - 104 U/L Final   04/17/2023 52 34 - 104 U/L Final   07/26/2018 74 43 - 122 U/L Final   06/19/2018 64 43 - 122 U/L Final     Total Protein   Date Value Ref Range Status   06/19/2018 7.6 5.9 - 8.4 G/DL Final     Total Bilirubin   Date Value Ref Range Status   06/19/2018 0.7 <1.3 mg/dL Final     Magnesium   Date Value Ref Range Status   07/26/2018 2.2 1.6 - 2.3 mg/dL Final     WBC   Date Value Ref Range Status   06/07/2023 6.97 4.31 - 10.16 Thousand/uL Final   04/17/2023 7.00 4.31 - 10.16 Thousand/uL Final   07/26/2018 9.20 4.50 - 11.00 Thousand/uL Final   06/19/2018 9.2 4.5 - 11.0 K/MCL Final     Hemoglobin   Date Value Ref Range Status   06/07/2023 14.3 12.0 - 17.0 g/dL Final   04/17/2023 13.8 12.0 - 17.0 g/dL Final   07/26/2018 15.7 13.5 - 17.5 g/dL Final   06/19/2018 15.2 13.5 - 17.5 G/DL Final     Platelets   Date Value Ref Range Status   06/07/2023 175 149 - 390 Thousands/uL Final   04/17/2023 171 149 - 390 Thousands/uL Final   07/26/2018 217 150 - 450 Thousands/uL Final   06/19/2018 215 150 - 450 K/MCL Final     PTT   Date Value Ref Range Status 07/26/2018 29 23 - 34 seconds Final     Comment:     Therapeutic Heparin Range =  52-80 seconds     INR   Date Value Ref Range Status   07/26/2018 1.02 0.89 - 1.10 Final     No results found for: "CKMB", "DIGOXIN"  No results found for: "TSH"  HDL, Direct   Date Value Ref Range Status   04/17/2023 48 >=40 mg/dL Final     Triglycerides   Date Value Ref Range Status   04/17/2023 206 (H) See Comment mg/dL Final     Comment:     Triglyceride:     0-9Y            <75mg/dL     10Y-17Y         <90 mg/dL       >=18Y     Normal          <150 mg/dL     Borderline High 150-199 mg/dL     High            200-499 mg/dL        Very High       >499 mg/dL    Specimen collection should occur prior to N-Acetylcysteine or Metamizole administration due to the potential for falsely depressed results. Hemoglobin A1C   Date Value Ref Range Status   04/17/2023 5.6 Normal 3.8-5.6%; PreDiabetic 5.7-6.4%; Diabetic >=6.5%; Glycemic control for adults with diabetes <7.0% % Final     Urine Culture   Date Value Ref Range Status   04/17/2023 30,000-39,000 cfu/ml  Final     Comment:     Mixed Contaminants X3       *-*-*-*-*-*-*-*-*-*-*-*-*-*-*-*-*-*-*-*-*-*-*-*-*-*-*-*-*-*-*-*-*-*-*-*-*-*-*-*-*-*-*-*-*-*-*-*-*-*-*-*-*-*-  RADIOLOGY RESULTS:  No results found. *-*-*-*-*-*-*-*-*-*-*-*-*-*-*-*-*-*-*-*-*-*-*-*-*-*-*-*-*-*-*-*-*-*-*-*-*-*-*-*-*-*-*-*-*-*-*-*-*-*-*-*-*-*-  LAST ECHOCARDIOGRAM AND OTHER CARDIOLOGY RESULTS:  No results found for this or any previous visit. No results found for this or any previous visit. No results found for this or any previous visit. No results found for this or any previous visit. *-*-*-*-*-*-*-*-*-*-*-*-*-*-*-*-*-*-*-*-*-*-*-*-*-*-*-*-*-*-*-*-*-*-*-*-*-*-*-*-*-*-*-*-*-*-*-*-*-*-*-*-*-*-  RADIOLOGY RESULTS:  No results found.     *-*-*-*-*-*-*-*-*-*-*-*-*-*-*-*-*-*-*-*-*-*-*-*-*-*-*-*-*-*-*-*-*-*-*-*-*-*-*-*-*-*-*-*-*-*-*-*-*-*-*-*-*-*-  ECHOCARDIOGRAM AND OTHER CARDIOLOGY RESULTS:  No results found for this or any previous visit. No results found for this or any previous visit. No results found for this or any previous visit. No results found for this or any previous visit.        *-*-*-*-*-*-*-*-*-*-*-*-*-*-*-*-*-*-*-*-*-*-*-*-*-*-*-*-*-*-*-*-*-*-*-*-*-*-*-*-*-*-*-*-*-*-*-*-*-*-*-*-*-*-  SIGNATURES:   @IVZ@   Javy Alcala MD     CC:   MD Damien Bush MD

## 2023-08-05 ENCOUNTER — APPOINTMENT (OUTPATIENT)
Dept: LAB | Facility: HOSPITAL | Age: 78
End: 2023-08-05
Payer: COMMERCIAL

## 2023-08-05 DIAGNOSIS — I10 HYPERTENSION, UNSPECIFIED TYPE: ICD-10-CM

## 2023-08-05 DIAGNOSIS — Z00.00 ROUTINE GENERAL MEDICAL EXAMINATION AT A HEALTH CARE FACILITY: ICD-10-CM

## 2023-08-05 DIAGNOSIS — E55.9 VITAMIN D DEFICIENCY: ICD-10-CM

## 2023-08-05 DIAGNOSIS — E53.8 VITAMIN B12 DEFICIENCY: ICD-10-CM

## 2023-08-05 DIAGNOSIS — E78.5 DYSLIPIDEMIA: ICD-10-CM

## 2023-08-05 DIAGNOSIS — N18.31 CHRONIC KIDNEY DISEASE (CKD) STAGE G3A/A1, MODERATELY DECREASED GLOMERULAR FILTRATION RATE (GFR) BETWEEN 45-59 ML/MIN/1.73 SQUARE METER AND ALBUMINURIA CREATININE RATIO LESS THAN 30 MG/G (HCC): ICD-10-CM

## 2023-08-05 LAB
25(OH)D3 SERPL-MCNC: 39.6 NG/ML (ref 30–100)
ALBUMIN SERPL BCP-MCNC: 4.4 G/DL (ref 3.5–5)
ALP SERPL-CCNC: 53 U/L (ref 34–104)
ALT SERPL W P-5'-P-CCNC: 24 U/L (ref 7–52)
ANION GAP SERPL CALCULATED.3IONS-SCNC: 10 MMOL/L
AST SERPL W P-5'-P-CCNC: 21 U/L (ref 13–39)
BILIRUB SERPL-MCNC: 0.48 MG/DL (ref 0.2–1)
BUN SERPL-MCNC: 23 MG/DL (ref 5–25)
CALCIUM SERPL-MCNC: 9.1 MG/DL (ref 8.4–10.2)
CHLORIDE SERPL-SCNC: 108 MMOL/L (ref 96–108)
CHOLEST SERPL-MCNC: 136 MG/DL
CO2 SERPL-SCNC: 22 MMOL/L (ref 21–32)
CREAT SERPL-MCNC: 1.27 MG/DL (ref 0.6–1.3)
GFR SERPL CREATININE-BSD FRML MDRD: 54 ML/MIN/1.73SQ M
GLUCOSE P FAST SERPL-MCNC: 101 MG/DL (ref 65–99)
HDLC SERPL-MCNC: 45 MG/DL
LDLC SERPL CALC-MCNC: 70 MG/DL (ref 0–100)
NONHDLC SERPL-MCNC: 91 MG/DL
POTASSIUM SERPL-SCNC: 4.1 MMOL/L (ref 3.5–5.3)
PROT SERPL-MCNC: 7.3 G/DL (ref 6.4–8.4)
SODIUM SERPL-SCNC: 140 MMOL/L (ref 135–147)
TRIGL SERPL-MCNC: 106 MG/DL
VIT B12 SERPL-MCNC: 391 PG/ML (ref 180–914)

## 2023-08-05 PROCEDURE — 36415 COLL VENOUS BLD VENIPUNCTURE: CPT

## 2023-08-05 PROCEDURE — 80053 COMPREHEN METABOLIC PANEL: CPT

## 2023-08-05 PROCEDURE — 84402 ASSAY OF FREE TESTOSTERONE: CPT

## 2023-08-05 PROCEDURE — 80061 LIPID PANEL: CPT

## 2023-08-05 PROCEDURE — 82607 VITAMIN B-12: CPT

## 2023-08-05 PROCEDURE — 84403 ASSAY OF TOTAL TESTOSTERONE: CPT

## 2023-08-05 PROCEDURE — 82306 VITAMIN D 25 HYDROXY: CPT

## 2023-08-09 LAB
TESTOST FREE SERPL-MCNC: 2.2 PG/ML (ref 6.6–18.1)
TESTOST SERPL-MCNC: 226 NG/DL (ref 264–916)

## 2023-08-10 ENCOUNTER — TELEPHONE (OUTPATIENT)
Dept: CARDIOLOGY CLINIC | Facility: CLINIC | Age: 78
End: 2023-08-10

## 2023-08-10 NOTE — TELEPHONE ENCOUNTER
Received call from [facility] @ Dr Mike Kaur office just to verify medications of patient. Told him patient was seen 7/14/23 and meds at that time were lisinopril 20 Plavix 75 Simvastatin  ASA and vascepa according to office note. .the patient did not request any rxs at that office and meds were continued from previous regimen. Told them at this time we had not filled any rxs for patient.

## 2023-08-14 ENCOUNTER — CLINICAL SUPPORT (OUTPATIENT)
Dept: CARDIOLOGY CLINIC | Facility: CLINIC | Age: 78
End: 2023-08-14
Payer: COMMERCIAL

## 2023-08-14 DIAGNOSIS — I48.0 PAROXYSMAL ATRIAL FIBRILLATION (HCC): ICD-10-CM

## 2023-08-14 PROCEDURE — 93248 EXT ECG>7D<15D REV&INTERPJ: CPT | Performed by: INTERNAL MEDICINE

## 2023-09-10 ENCOUNTER — DOCUMENTATION (OUTPATIENT)
Dept: CARDIOLOGY CLINIC | Facility: CLINIC | Age: 78
End: 2023-09-10

## 2023-09-10 NOTE — PROGRESS NOTES
[facility]-XT EXTENDED AMBULATORY HOLTER MONITORING REPORT      -- Patient was monitored for 14 days between 07/19/2023 and 08/02/2023. -- Average heart rate during the recording was 60 beats per minute, minimum heart rate was 39 beats per minute and maximum heart rate was 158 beats per minute. -- Predominant rhythm was normal sinus.  .  -- 5 Ventricular Tachycardia runs occurred, the run with the fastest interval lasting 7 beats with a max rate of 143 bpm, the longest lasting 9 beats with an avg rate of 123 bpm.   -- 246 Supraventricular Tachycardia runs occurred, the run with the fastest interval lasting 6 beats with a max rate of 158 bpm, the longest lasting 18 beats with an avg rate of 100 bpm.  -- There were no pause events. -- There was no 2nd degree or higher AV block. -- Atrial Fibrillation occurred (<1% burden), ranging from  bpm (avg of 99 bpm), the longest lasting 43 secs with an avg rate of 99 bpm.   -- Isolated SVEs were frequent (5.1%, [id number]), SVE Couplets were occasional (1.0%, 6053), and SVE Triplets were rare (<1.0%, 2509). -- Isolated VEs were rare (<1.0%), VE Couplets were rare (<1.0%), and no VE Triplets were present. Ventricular Bigeminy and Trigeminy were present.   -- There were no triggered events and no diary entries.     Conclusion: Abnormal extended Holter monitor findings.  Predominant rhythm was sinus.  There were episodes of atrial fibrillation.  The overall burden of atrial fibrillation was less than 1%.  There were nonsustained ventricular tachycardia events, however rhythm review of the incident suggest that these are more likely supraventricular arrhythmia events with conduction abnormality.  There were several nonsustained supraventricular tachycardia events.  Rhythm review of these episodes suggested ectopic atrial tachycardia.  No symptoms where reported.     Recommendations: Considering occurrence of atrial fibrillation and prior history of stroke recommend therapeutic anticoagulation with NOAC or warfarin.  As patient is currently on aspirin and Plavix Plavix can be discontinued with initiation anticoagulation therapy.   We will discuss recommendations with patient's primary physician MD Annmarie Miller MD

## 2025-02-25 LAB — HCV AB SER-ACNC: NORMAL

## 2025-07-17 ENCOUNTER — HOSPITAL ENCOUNTER (OUTPATIENT)
Dept: NON INVASIVE DIAGNOSTICS | Facility: HOSPITAL | Age: 80
Discharge: HOME | End: 2025-07-17
Attending: NURSE PRACTITIONER
Payer: COMMERCIAL

## 2025-07-17 DIAGNOSIS — I25.10 ATHEROSCLEROSIS OF NATIVE CORONARY ARTERY OF NATIVE HEART WITHOUT ANGINA PECTORIS: ICD-10-CM

## 2025-07-17 PROCEDURE — 93880 EXTRACRANIAL BILAT STUDY: CPT | Performed by: SURGERY

## 2025-07-17 PROCEDURE — 93880 EXTRACRANIAL BILAT STUDY: CPT
